# Patient Record
Sex: MALE | Race: BLACK OR AFRICAN AMERICAN | NOT HISPANIC OR LATINO | Employment: FULL TIME | URBAN - METROPOLITAN AREA
[De-identification: names, ages, dates, MRNs, and addresses within clinical notes are randomized per-mention and may not be internally consistent; named-entity substitution may affect disease eponyms.]

---

## 2017-02-10 ENCOUNTER — OFFICE VISIT (OUTPATIENT)
Dept: FAMILY MEDICINE CLINIC | Facility: CLINIC | Age: 42
End: 2017-02-10

## 2017-02-10 VITALS
DIASTOLIC BLOOD PRESSURE: 82 MMHG | TEMPERATURE: 97.6 F | HEART RATE: 76 BPM | HEIGHT: 60 IN | OXYGEN SATURATION: 98 % | BODY MASS INDEX: 50.45 KG/M2 | SYSTOLIC BLOOD PRESSURE: 124 MMHG | WEIGHT: 257 LBS

## 2017-02-10 DIAGNOSIS — Z00.00 ROUTINE GENERAL MEDICAL EXAMINATION AT A HEALTH CARE FACILITY: Primary | ICD-10-CM

## 2017-02-10 DIAGNOSIS — Z23 IMMUNIZATION DUE: ICD-10-CM

## 2017-02-10 PROCEDURE — 99395 PREV VISIT EST AGE 18-39: CPT | Performed by: PHYSICIAN ASSISTANT

## 2017-02-10 PROCEDURE — 93000 ELECTROCARDIOGRAM COMPLETE: CPT | Performed by: PHYSICIAN ASSISTANT

## 2017-02-10 RX ORDER — MECLIZINE HYDROCHLORIDE 25 MG/1
25 TABLET ORAL 3 TIMES DAILY PRN
Qty: 30 TABLET | Refills: 1 | Status: SHIPPED | OUTPATIENT
Start: 2017-02-10 | End: 2018-09-21 | Stop reason: SDUPTHER

## 2017-02-10 NOTE — PROGRESS NOTES
Subjective   Burke Cedeno Jr is a 41 y.o. male    History of Present Illness  Patient's 41-year-old black male who comes in for preventive medical exam, has occasional back pain, no shortness breath no chest pain      The following portions of the patient's history were reviewed and updated as appropriate: allergies, current medications, past social history and problem list    Review of Systems   Constitutional: Negative.    HENT: Negative.    Eyes: Negative.    Respiratory: Negative.    Cardiovascular: Negative.    Gastrointestinal: Negative.    Endocrine: Negative.    Genitourinary: Negative.    Musculoskeletal: Positive for back pain.   Skin: Negative.    Allergic/Immunologic: Negative.    Neurological: Negative.    Hematological: Negative.    Psychiatric/Behavioral: Negative.    All other systems reviewed and are negative.      Objective     Vitals:    02/10/17 0923   BP: 124/82   Pulse: 76   Temp: 97.6 °F (36.4 °C)   SpO2: 98%       Physical Exam   Constitutional: He is oriented to person, place, and time. He appears well-developed and well-nourished.   HENT:   Head: Normocephalic and atraumatic.   Right Ear: External ear normal.   Left Ear: External ear normal.   Nose: Nose normal.   Mouth/Throat: Oropharynx is clear and moist.   Eyes: Conjunctivae and EOM are normal. Pupils are equal, round, and reactive to light.   Neck: Normal range of motion. Neck supple. No thyromegaly present.   Cardiovascular: Normal rate, regular rhythm, normal heart sounds and intact distal pulses.    No murmur heard.  Pulmonary/Chest: Effort normal and breath sounds normal.   Abdominal: Soft. Bowel sounds are normal. He exhibits no mass. There is no tenderness.   Genitourinary: Rectum normal, prostate normal and penis normal.   Musculoskeletal: Normal range of motion. He exhibits no edema.        Lumbar back: He exhibits tenderness.   Neurological: He is alert and oriented to person, place, and time. He has normal reflexes. No cranial  nerve deficit.   Skin: Skin is warm and dry.   Psychiatric: He has a normal mood and affect.       ECG 12 Lead  Date/Time: 2/10/2017 9:43 AM  Performed by: AUGUSTO GRESHAM  Authorized by: AUGUSTO GRESHAM   Comparison: not compared with previous ECG   Previous ECG: no previous ECG available  Rhythm: sinus rhythm  Rate: normal  ST Segments: ST segments normal  T Waves: T waves normal  QRS axis: normal  Other: no other findings  Clinical impression: normal ECG  Comments: Normal, NSR          Assessment/Plan     Diagnoses and all orders for this visit:    Routine general medical examination at a health care facility  -     ECG 12 Lead  -     Lipid Panel  -     CBC & Differential  -     TSH  -     Comprehensive Metabolic Panel  -     meclizine (ANTIVERT) 25 MG tablet; Take 1 tablet by mouth 3 (Three) Times a Day As Needed for dizziness.    Immunization due  -     Tdap Vaccine Greater Than or Equal To 6yo IM     preventive medicine discussed, diet, exercise, healthy living tips, discussed diet low carb, low calorie, exercise 3-5 times per week for 30 minutes.

## 2017-04-27 ENCOUNTER — TELEPHONE (OUTPATIENT)
Dept: FAMILY MEDICINE CLINIC | Facility: CLINIC | Age: 42
End: 2017-04-27

## 2017-04-27 NOTE — TELEPHONE ENCOUNTER
----- Message from Maira Bonilla sent at 4/27/2017 10:54 AM EDT -----  Contact: PATIENT  PLEASE CALL HIM WITH RESULTS OF HIS LABS ON 2/11/17 [phone]

## 2017-04-27 NOTE — TELEPHONE ENCOUNTER
Labs were normal with nothing out of range. Advised pt of this and he verbalized understanding. Pt requested a copy be mailed to him which I have placed up front for mailing.

## 2017-08-14 ENCOUNTER — OFFICE VISIT (OUTPATIENT)
Dept: FAMILY MEDICINE CLINIC | Facility: CLINIC | Age: 42
End: 2017-08-14

## 2017-08-14 VITALS
BODY MASS INDEX: 35.42 KG/M2 | DIASTOLIC BLOOD PRESSURE: 66 MMHG | HEIGHT: 71 IN | RESPIRATION RATE: 18 BRPM | SYSTOLIC BLOOD PRESSURE: 124 MMHG | TEMPERATURE: 98.8 F | WEIGHT: 253 LBS

## 2017-08-14 DIAGNOSIS — J40 BRONCHITIS: Primary | ICD-10-CM

## 2017-08-14 DIAGNOSIS — R42 DIZZINESS: ICD-10-CM

## 2017-08-14 LAB — GLUCOSE BLDC GLUCOMTR-MCNC: 104 MG/DL (ref 70–130)

## 2017-08-14 PROCEDURE — 82962 GLUCOSE BLOOD TEST: CPT | Performed by: PHYSICIAN ASSISTANT

## 2017-08-14 PROCEDURE — 99213 OFFICE O/P EST LOW 20 MIN: CPT | Performed by: PHYSICIAN ASSISTANT

## 2017-08-14 RX ORDER — PREDNISONE 20 MG/1
20 TABLET ORAL 2 TIMES DAILY
Qty: 14 TABLET | Refills: 0 | Status: SHIPPED | OUTPATIENT
Start: 2017-08-14 | End: 2018-11-13

## 2017-08-14 NOTE — PROGRESS NOTES
Subjective   Burke Cedeno Jr is a 41 y.o. male    History of Present Illness  Patient pleasant 41-year-old black male comes in plan of sinus pressure sinus congestion productive cough with green-yellow sputum cough is worse at night when lying down, patient states she has mild sore throat symptoms been present for 2 weeks patient complains of dizziness lightheadedness dizziness is worse in the morning patient's concerned about possible diabetes.  No over-the-counter medications has been taking  The following portions of the patient's history were reviewed and updated as appropriate: allergies, current medications, past social history and problem list    Review of Systems   Constitutional: Negative for chills, fatigue and fever.   HENT: Positive for congestion, ear pain, postnasal drip, rhinorrhea and sinus pressure. Negative for sore throat.    Eyes: Positive for pain.   Respiratory: Positive for cough, chest tightness and wheezing. Negative for shortness of breath.    Cardiovascular: Negative for chest pain.   Gastrointestinal: Negative for nausea.   Neurological: Positive for headaches. Negative for dizziness.   Hematological: Negative for adenopathy.       Objective     Vitals:    08/14/17 1304   BP: 124/66   Resp: 18   Temp: 98.8 °F (37.1 °C)       Physical Exam   Constitutional: He appears well-developed and well-nourished. No distress.   HENT:   Head: Normocephalic and atraumatic.   Right Ear: Tympanic membrane and ear canal normal.   Left Ear: Tympanic membrane and ear canal normal.   Nose: Mucosal edema, rhinorrhea and sinus tenderness present. Right sinus exhibits maxillary sinus tenderness and frontal sinus tenderness. Left sinus exhibits maxillary sinus tenderness and frontal sinus tenderness.   Mouth/Throat: Oropharynx is clear and moist. No oropharyngeal exudate.   Eyes: Pupils are equal, round, and reactive to light.   Neck: Neck supple. No JVD present.   Cardiovascular: Normal rate, regular rhythm and  normal heart sounds.    No murmur heard.  Pulmonary/Chest: Effort normal and breath sounds normal. No stridor. No respiratory distress.   Musculoskeletal: He exhibits no edema.   Lymphadenopathy:     He has no cervical adenopathy.   Skin: He is not diaphoretic.   Nursing note and vitals reviewed.      Assessment/Plan     Diagnoses and all orders for this visit:    Bronchitis  -     clarithromycin XL (BIAXIN XL) 500 MG 24 hr tablet; Take 2 tablets by mouth Daily.  -     predniSONE (DELTASONE) 20 MG tablet; Take 1 tablet by mouth 2 (Two) Times a Day.    Dizziness  -     POCT Glucose    Tussionex 1 teaspoon every 12 hours when necessary cough #120ml

## 2018-09-21 ENCOUNTER — TELEPHONE (OUTPATIENT)
Dept: FAMILY MEDICINE CLINIC | Facility: CLINIC | Age: 43
End: 2018-09-21

## 2018-09-21 DIAGNOSIS — Z00.00 ROUTINE GENERAL MEDICAL EXAMINATION AT A HEALTH CARE FACILITY: ICD-10-CM

## 2018-09-21 RX ORDER — MECLIZINE HYDROCHLORIDE 25 MG/1
25 TABLET ORAL 3 TIMES DAILY PRN
Qty: 30 TABLET | Refills: 0 | Status: SHIPPED | OUTPATIENT
Start: 2018-09-21 | End: 2018-11-13

## 2018-09-21 NOTE — TELEPHONE ENCOUNTER
----- Message from Danuta Armenta sent at 9/21/2018  2:45 PM EDT -----  Contact: PT  REFILL REQUEST    meclizine (ANTIVERT) 25 MG tablet    [facility]/pharmacy #[id number] - [facility] [address] - [phone]  - [phone] (Phone)  [phone] (Fax)

## 2018-11-13 ENCOUNTER — OFFICE VISIT (OUTPATIENT)
Dept: FAMILY MEDICINE CLINIC | Facility: CLINIC | Age: 43
End: 2018-11-13

## 2018-11-13 VITALS
HEIGHT: 71 IN | TEMPERATURE: 98.1 F | BODY MASS INDEX: 37.27 KG/M2 | OXYGEN SATURATION: 99 % | SYSTOLIC BLOOD PRESSURE: 118 MMHG | HEART RATE: 63 BPM | RESPIRATION RATE: 16 BRPM | DIASTOLIC BLOOD PRESSURE: 84 MMHG | WEIGHT: 266.2 LBS

## 2018-11-13 DIAGNOSIS — Z00.00 ROUTINE GENERAL MEDICAL EXAMINATION AT A HEALTH CARE FACILITY: Primary | ICD-10-CM

## 2018-11-13 DIAGNOSIS — Z12.5 SPECIAL SCREENING FOR MALIGNANT NEOPLASM OF PROSTATE: ICD-10-CM

## 2018-11-13 LAB
BILIRUB BLD-MCNC: NEGATIVE MG/DL
CLARITY, POC: CLEAR
COLOR UR: ABNORMAL
GLUCOSE UR STRIP-MCNC: NEGATIVE MG/DL
KETONES UR QL: ABNORMAL
LEUKOCYTE EST, POC: NEGATIVE
NITRITE UR-MCNC: NEGATIVE MG/ML
PH UR: 5 [PH] (ref 5–8)
PROT UR STRIP-MCNC: ABNORMAL MG/DL
RBC # UR STRIP: ABNORMAL /UL
SP GR UR: 1.02 (ref 1–1.03)
UROBILINOGEN UR QL: NORMAL

## 2018-11-13 PROCEDURE — 93000 ELECTROCARDIOGRAM COMPLETE: CPT | Performed by: PHYSICIAN ASSISTANT

## 2018-11-13 PROCEDURE — 81003 URINALYSIS AUTO W/O SCOPE: CPT | Performed by: PHYSICIAN ASSISTANT

## 2018-11-13 PROCEDURE — 99396 PREV VISIT EST AGE 40-64: CPT | Performed by: PHYSICIAN ASSISTANT

## 2018-11-13 NOTE — PROGRESS NOTES
Subjective   Burke Cedeno Jr is a 43 y.o. male  Annual Exam (Pt is fasting. Already had flu vaccine. )      History of Present Illness  Patient is a 43-year-old black male comes in for preventive medical examination, denies any problems or complaints doing well no shortness breath no chest pain  The following portions of the patient's history were reviewed and updated as appropriate: allergies, current medications, past social history and problem list    Review of Systems   Constitutional: Negative.    HENT: Negative.    Eyes: Negative.    Respiratory: Negative.    Cardiovascular: Negative.    Gastrointestinal: Negative.    Endocrine: Negative.    Genitourinary: Negative.    Musculoskeletal: Negative.    Skin: Negative.    Allergic/Immunologic: Negative.    Neurological: Negative.    Hematological: Negative.    Psychiatric/Behavioral: Negative.    All other systems reviewed and are negative.      Objective     Vitals:    11/13/18 1332   BP: 118/84   Pulse: 63   Resp: 16   Temp: 98.1 °F (36.7 °C)   SpO2: 99%       Physical Exam   Constitutional: He is oriented to person, place, and time. He appears well-developed and well-nourished.   HENT:   Head: Normocephalic and atraumatic.   Right Ear: External ear normal.   Left Ear: External ear normal.   Nose: Nose normal.   Mouth/Throat: Oropharynx is clear and moist.   Eyes: Conjunctivae and EOM are normal. Pupils are equal, round, and reactive to light.   Neck: Normal range of motion. Neck supple. No thyromegaly present.   Cardiovascular: Normal rate, regular rhythm, normal heart sounds and intact distal pulses.   No murmur heard.  Pulmonary/Chest: Effort normal and breath sounds normal.   Abdominal: Soft. Bowel sounds are normal. He exhibits no mass. There is no tenderness.   Genitourinary: Rectum normal, prostate normal and penis normal.   Musculoskeletal: Normal range of motion. He exhibits no edema.   Neurological: He is alert and oriented to person, place, and time. He  has normal reflexes. No cranial nerve deficit.   Skin: Skin is warm and dry.   Psychiatric: He has a normal mood and affect.       ECG 12 Lead  Date/Time: 11/13/2018 2:16 PM  Performed by: Terrence Freitas PA  Authorized by: Terrence Freitas PA   Rate: normal  Conduction: conduction normal  ST Segments: ST segments normal  T Waves: T waves normal  QRS axis: normal  Clinical impression: normal ECG          Assessment/Plan     Diagnoses and all orders for this visit:    Routine general medical examination at a health care facility  -     POCT urinalysis dipstick, automated  -     CBC & Differential; Future  -     Comprehensive Metabolic Panel; Future  -     TSH; Future  -     Lipid Panel; Future    Special screening for malignant neoplasm of prostate  -     PSA Screen; Future    Preventive medicine discussed, diet, exercise, healthy living, discussed importance of losing weight exercise 3-5 times per week for 30 minutes low-carb low calorie diet

## 2018-11-30 ENCOUNTER — TELEPHONE (OUTPATIENT)
Dept: FAMILY MEDICINE CLINIC | Facility: CLINIC | Age: 43
End: 2018-11-30

## 2018-11-30 NOTE — TELEPHONE ENCOUNTER
----- Message from Danuta Armenta sent at 11/30/2018  1:10 PM EST -----  Contact: PT  REQUESTING LAB RESULTS. PLEASE CALL [phone]

## 2019-11-12 DIAGNOSIS — Z00.00 ROUTINE GENERAL MEDICAL EXAMINATION AT A HEALTH CARE FACILITY: ICD-10-CM

## 2019-11-12 RX ORDER — MECLIZINE HYDROCHLORIDE 25 MG/1
25 TABLET ORAL 3 TIMES DAILY PRN
Qty: 30 TABLET | Refills: 0 | Status: SHIPPED | OUTPATIENT
Start: 2019-11-12 | End: 2021-02-24

## 2020-02-17 ENCOUNTER — OFFICE VISIT (OUTPATIENT)
Dept: FAMILY MEDICINE CLINIC | Facility: CLINIC | Age: 45
End: 2020-02-17

## 2020-02-17 VITALS
RESPIRATION RATE: 16 BRPM | TEMPERATURE: 97.5 F | HEART RATE: 69 BPM | WEIGHT: 266.2 LBS | OXYGEN SATURATION: 98 % | BODY MASS INDEX: 37.27 KG/M2 | DIASTOLIC BLOOD PRESSURE: 84 MMHG | HEIGHT: 71 IN | SYSTOLIC BLOOD PRESSURE: 122 MMHG

## 2020-02-17 DIAGNOSIS — Z00.00 ROUTINE GENERAL MEDICAL EXAMINATION AT A HEALTH CARE FACILITY: Primary | ICD-10-CM

## 2020-02-17 DIAGNOSIS — Z12.5 SPECIAL SCREENING FOR MALIGNANT NEOPLASM OF PROSTATE: ICD-10-CM

## 2020-02-17 LAB
BILIRUB BLD-MCNC: NEGATIVE MG/DL
CLARITY, POC: CLEAR
COLOR UR: YELLOW
GLUCOSE UR STRIP-MCNC: NEGATIVE MG/DL
KETONES UR QL: NEGATIVE
LEUKOCYTE EST, POC: NEGATIVE
NITRITE UR-MCNC: NEGATIVE MG/ML
PH UR: 6 [PH] (ref 5–8)
PROT UR STRIP-MCNC: ABNORMAL MG/DL
RBC # UR STRIP: ABNORMAL /UL
SP GR UR: 1.03 (ref 1–1.03)
UROBILINOGEN UR QL: NORMAL

## 2020-02-17 PROCEDURE — 81003 URINALYSIS AUTO W/O SCOPE: CPT | Performed by: PHYSICIAN ASSISTANT

## 2020-02-17 PROCEDURE — 93000 ELECTROCARDIOGRAM COMPLETE: CPT | Performed by: PHYSICIAN ASSISTANT

## 2020-02-17 PROCEDURE — 99396 PREV VISIT EST AGE 40-64: CPT | Performed by: PHYSICIAN ASSISTANT

## 2020-02-17 RX ORDER — SUMATRIPTAN 100 MG/1
TABLET, FILM COATED ORAL
Qty: 9 TABLET | Refills: 1 | Status: SHIPPED | OUTPATIENT
Start: 2020-02-17 | End: 2021-02-24

## 2020-02-17 RX ORDER — PROMETHAZINE HYDROCHLORIDE 25 MG/1
25 TABLET ORAL EVERY 6 HOURS PRN
Qty: 30 TABLET | Refills: 1 | Status: SHIPPED | OUTPATIENT
Start: 2020-02-17 | End: 2021-02-24

## 2020-02-17 NOTE — PROGRESS NOTES
Subjective   Burke Cedeno Jr is a 44 y.o. male  Annual Exam (Not fasting. Had flu vaccine. )      History of Present Illness  Patient is a pleasant 42-year-old white male who comes in for preventive medical examination complains of recurrent sick headaches he has photophobia and phonophobia 2 to 3/week states he has unilateral headache with nausea  The following portions of the patient's history were reviewed and updated as appropriate: allergies, current medications, past social history and problem list    Review of Systems   Constitutional: Negative.    Eyes: Negative.    Respiratory: Negative.    Cardiovascular: Negative.    Gastrointestinal: Negative.    Endocrine: Negative.    Genitourinary: Negative.    Musculoskeletal: Negative.    Skin: Negative.    Allergic/Immunologic: Negative.    Neurological: Positive for headaches.   Hematological: Negative.    Psychiatric/Behavioral: Negative.    All other systems reviewed and are negative.      Objective     Vitals:    02/17/20 1345   BP: 122/84   Pulse: 69   Resp: 16   Temp: 97.5 °F (36.4 °C)   SpO2: 98%       Physical Exam   Constitutional: He is oriented to person, place, and time. He appears well-developed and well-nourished.   HENT:   Head: Normocephalic and atraumatic.   Right Ear: External ear normal.   Left Ear: External ear normal.   Nose: Nose normal.   Mouth/Throat: Oropharynx is clear and moist.   Eyes: Pupils are equal, round, and reactive to light. Conjunctivae and EOM are normal.   Neck: Normal range of motion. Neck supple. No thyromegaly present.   Cardiovascular: Normal rate, regular rhythm, normal heart sounds and intact distal pulses.   No murmur heard.  Pulmonary/Chest: Effort normal and breath sounds normal.   Abdominal: Soft. Bowel sounds are normal. He exhibits no mass. There is no tenderness.   Genitourinary: Rectum normal, prostate normal and penis normal.   Musculoskeletal: Normal range of motion. He exhibits no edema.   Neurological: He is  alert and oriented to person, place, and time. He has normal reflexes. No cranial nerve deficit.   Skin: Skin is warm and dry.   Psychiatric: He has a normal mood and affect.       ECG 12 Lead  Date/Time: 2/17/2020 2:38 PM  Performed by: Terrence Freitas PA  Authorized by: Terrence Freitas PA   Comparison: not compared with previous ECG   Rhythm: sinus rhythm  Rate: normal  ST Segments: ST segments normal  T Waves: T waves normal  QRS axis: normal    Clinical impression: normal ECG          Assessment/Plan     Burke was seen today for annual exam.    Diagnoses and all orders for this visit:    Routine general medical examination at a health care facility  -     POCT urinalysis dipstick, automated  -     Comprehensive Metabolic Panel; Future  -     CBC & Differential; Future  -     Lipid Panel; Future  -     TSH; Future    Special screening for malignant neoplasm of prostate  -     PSA Screen; Future    Other orders  -     SUMAtriptan (IMITREX) 100 MG tablet; Take one tablet at onset of headache. May repeat dose one time in 2 hours if headache not relieved.  -     promethazine (PHENERGAN) 25 MG tablet; Take 1 tablet by mouth Every 6 (Six) Hours As Needed for Nausea or Vomiting.    Preventive medicine discussed, discussed importance of losing weight exercise low carb diet exercise 3-5 times per week for 30 minutes discussed headaches given prescription sumatriptan for migraine headaches

## 2020-02-18 NOTE — PROGRESS NOTES
I have reviewed the notes, assessments, and/or procedures performed by Jay Freitas PA-C, I concur with his documentation of Burke Cedeno Jr.

## 2020-10-02 ENCOUNTER — TELEPHONE (OUTPATIENT)
Dept: FAMILY MEDICINE CLINIC | Facility: CLINIC | Age: 45
End: 2020-10-02

## 2020-10-02 RX ORDER — CEFDINIR 300 MG/1
300 CAPSULE ORAL 2 TIMES DAILY
Qty: 20 CAPSULE | Refills: 0 | Status: SHIPPED | OUTPATIENT
Start: 2020-10-02 | End: 2021-02-24

## 2020-10-02 RX ORDER — PREDNISONE 20 MG/1
20 TABLET ORAL 2 TIMES DAILY
Qty: 14 TABLET | Refills: 0 | Status: SHIPPED | OUTPATIENT
Start: 2020-10-02 | End: 2021-02-24

## 2021-02-24 ENCOUNTER — OFFICE VISIT (OUTPATIENT)
Dept: FAMILY MEDICINE CLINIC | Facility: CLINIC | Age: 46
End: 2021-02-24

## 2021-02-24 VITALS
BODY MASS INDEX: 35.64 KG/M2 | OXYGEN SATURATION: 99 % | DIASTOLIC BLOOD PRESSURE: 78 MMHG | RESPIRATION RATE: 14 BRPM | SYSTOLIC BLOOD PRESSURE: 120 MMHG | HEIGHT: 71 IN | TEMPERATURE: 97.1 F | WEIGHT: 254.6 LBS | HEART RATE: 74 BPM

## 2021-02-24 DIAGNOSIS — Z12.5 SPECIAL SCREENING FOR MALIGNANT NEOPLASM OF PROSTATE: ICD-10-CM

## 2021-02-24 DIAGNOSIS — Z00.00 ROUTINE GENERAL MEDICAL EXAMINATION AT A HEALTH CARE FACILITY: Primary | ICD-10-CM

## 2021-02-24 PROCEDURE — 99396 PREV VISIT EST AGE 40-64: CPT | Performed by: PHYSICIAN ASSISTANT

## 2021-02-24 RX ORDER — ACETAMINOPHEN 160 MG
2000 TABLET,DISINTEGRATING ORAL DAILY
COMMUNITY
End: 2022-12-12

## 2021-02-24 NOTE — PROGRESS NOTES
Subjective   Burke Cedeno Jr is a 45 y.o. male  Annual Exam      History of Present Illness  Patient is a pleasant 45-year-old black male who comes in preventive medical examination denies any problems or complaints has lost 30 pounds exercising doing well.      The following portions of the patient's history were reviewed and updated as appropriate: allergies, current medications, past social history and problem list    Review of Systems   Constitutional: Negative for appetite change, diaphoresis, fatigue and unexpected weight change.   Eyes: Negative for visual disturbance.   Respiratory: Negative for cough, chest tightness and shortness of breath.    Cardiovascular: Negative for chest pain, palpitations and leg swelling.   Gastrointestinal: Negative for diarrhea, nausea and vomiting.   Endocrine: Negative for polydipsia, polyphagia and polyuria.   Skin: Negative for color change and rash.   Neurological: Negative for dizziness, syncope, weakness, light-headedness, numbness and headaches.       Objective     Vitals:    02/24/21 1602   BP: 120/78   Pulse: 74   Resp: 14   Temp: 97.1 °F (36.2 °C)   SpO2: 99%       Physical Exam  Vitals signs and nursing note reviewed.   Constitutional:       Appearance: He is well-developed. He is not diaphoretic.   Neck:      Musculoskeletal: Neck supple.      Thyroid: No thyromegaly.      Vascular: No JVD.   Cardiovascular:      Rate and Rhythm: Normal rate and regular rhythm.      Pulses: Normal pulses.      Heart sounds: Normal heart sounds. No murmur.   Pulmonary:      Effort: Pulmonary effort is normal. No respiratory distress.      Breath sounds: Normal breath sounds.   Abdominal:      General: Bowel sounds are normal.      Palpations: Abdomen is soft.      Tenderness: There is no abdominal tenderness.   Lymphadenopathy:      Cervical: No cervical adenopathy.   Skin:     General: Skin is warm and dry.   Neurological:      Sensory: No sensory deficit.       ECG 12  Lead    Date/Time: 3/17/2021 4:27 PM  Performed by: Terrence Freitas PA  Authorized by: Terrence Freitas PA   Comparison: not compared with previous ECG   Rate: normal  Conduction: conduction normal  ST Segments: ST segments normal  T Waves: T waves normal  QRS axis: normal    Clinical impression: normal ECG            Assessment/Plan     Diagnoses and all orders for this visit:    1. Routine general medical examination at a health care facility (Primary)  -     Cancel: PSA Screen; Future  -     CBC & Differential; Future  -     Comprehensive Metabolic Panel; Future  -     Hemoglobin A1c; Future  -     TSH; Future  -     Lipid Panel; Future  -     Hepatitis C Antibody; Future  -     Hepatitis C Antibody  -     Lipid Panel  -     PSA Screen  -     Comprehensive Metabolic Panel  -     CBC & Differential  -     Hemoglobin A1c  -     TSH    2. Special screening for malignant neoplasm of prostate  -     Cancel: CBC & Differential; Future  -     Cancel: Comprehensive Metabolic Panel; Future  -     Cancel: Hemoglobin A1c; Future  -     Cancel: TSH; Future  -     Cancel: Lipid Panel; Future  -     Cancel: Hepatitis C Antibody; Future  -     PSA Screen; Future    Preventive medicine discussed, diet, exercise, healthy living discussed importance of losing weight and exercise.

## 2021-03-17 PROCEDURE — 93000 ELECTROCARDIOGRAM COMPLETE: CPT | Performed by: PHYSICIAN ASSISTANT

## 2021-06-07 ENCOUNTER — TELEPHONE (OUTPATIENT)
Dept: FAMILY MEDICINE CLINIC | Facility: CLINIC | Age: 46
End: 2021-06-07

## 2021-06-07 RX ORDER — CYCLOBENZAPRINE HCL 10 MG
10 TABLET ORAL 3 TIMES DAILY
Qty: 21 TABLET | Refills: 0 | Status: SHIPPED | OUTPATIENT
Start: 2021-06-07 | End: 2021-06-14

## 2021-06-07 RX ORDER — METHYLPREDNISOLONE 4 MG/1
TABLET ORAL
Qty: 30 EACH | Refills: 0 | Status: SHIPPED | OUTPATIENT
Start: 2021-06-07 | End: 2021-06-15

## 2021-06-15 ENCOUNTER — OFFICE VISIT (OUTPATIENT)
Dept: FAMILY MEDICINE CLINIC | Facility: CLINIC | Age: 46
End: 2021-06-15

## 2021-06-15 VITALS
HEIGHT: 71 IN | SYSTOLIC BLOOD PRESSURE: 122 MMHG | OXYGEN SATURATION: 97 % | TEMPERATURE: 97.6 F | DIASTOLIC BLOOD PRESSURE: 80 MMHG | HEART RATE: 74 BPM | BODY MASS INDEX: 35.48 KG/M2 | RESPIRATION RATE: 16 BRPM | WEIGHT: 253.4 LBS

## 2021-06-15 DIAGNOSIS — S39.012A STRAIN OF LUMBAR REGION, INITIAL ENCOUNTER: Primary | ICD-10-CM

## 2021-06-15 PROCEDURE — 99213 OFFICE O/P EST LOW 20 MIN: CPT | Performed by: PHYSICIAN ASSISTANT

## 2021-06-15 RX ORDER — TIZANIDINE HYDROCHLORIDE 4 MG/1
4 CAPSULE, GELATIN COATED ORAL NIGHTLY
Qty: 30 CAPSULE | Refills: 1 | Status: SHIPPED | OUTPATIENT
Start: 2021-06-15 | End: 2022-12-12

## 2021-06-15 NOTE — PROGRESS NOTES
Subjective   Burke Cedeno Jr is a 45 y.o. male  Back Pain      History of Present Illness  Patient is a pleasant 45-year-old male who comes in complaining of chronic low back pain, patient states Flexeril did help symptoms but he cannot tolerate meds due to sedation.  Patient has pain with sitting standing pain is mainly located in lower back, has pain with sitting standing pain is moderate to severe large amount of spasm in lower back.  The following portions of the patient's history were reviewed and updated as appropriate: allergies, current medications, past social history and problem list    Review of Systems   Constitutional: Negative for appetite change, diaphoresis, fatigue and unexpected weight change.   Eyes: Negative for visual disturbance.   Respiratory: Negative for cough, chest tightness and shortness of breath.    Cardiovascular: Negative for chest pain, palpitations and leg swelling.   Gastrointestinal: Negative for diarrhea, nausea and vomiting.   Endocrine: Negative for polydipsia, polyphagia and polyuria.   Musculoskeletal: Positive for back pain.   Skin: Negative for color change and rash.   Neurological: Negative for dizziness, syncope, weakness, light-headedness, numbness and headaches.       Objective     Vitals:    06/15/21 1135   BP: 122/80   Pulse: 74   Resp: 16   Temp: 97.6 °F (36.4 °C)   SpO2: 97%       Physical Exam  Vitals and nursing note reviewed.   Constitutional:       Appearance: He is well-developed.   Neck:      Vascular: No JVD.   Cardiovascular:      Rate and Rhythm: Normal rate and regular rhythm.      Pulses: Normal pulses.      Heart sounds: Normal heart sounds. No murmur heard.     Pulmonary:      Effort: Pulmonary effort is normal. No respiratory distress.      Breath sounds: Normal breath sounds.   Abdominal:      General: Bowel sounds are normal.      Palpations: Abdomen is soft.      Tenderness: There is no abdominal tenderness.   Musculoskeletal:      Lumbar back: Bony  tenderness present. Decreased range of motion.   Skin:     General: Skin is warm and dry.         Assessment/Plan     Diagnoses and all orders for this visit:    1. Strain of lumbar region, initial encounter (Primary)  -     TiZANidine (Zanaflex) 4 MG capsule; Take 1 capsule by mouth Every Night.  Dispense: 30 capsule; Refill: 1  -     Ambulatory Referral to Physical Therapy    Follow-up if no better, follow-up after physical therapy recheck in 3 weeks

## 2021-08-18 ENCOUNTER — TELEPHONE (OUTPATIENT)
Dept: FAMILY MEDICINE CLINIC | Facility: CLINIC | Age: 46
End: 2021-08-18

## 2021-08-18 NOTE — TELEPHONE ENCOUNTER
Caller: Burke Cedeno Jr    Relationship to patient: Self    Best call back number: [phone]     Date of exposure:     Date of positive COVID19 test: 08/18/2021 SARS WAITING ON SECOND RESULT    Date if possible COVID19 exposure:     COVID19 symptoms: BAD COUGH, THROAT IRRITATION, HEADACHE    Date of initial quarantine: 08/18/2021    Additional information or concerns: PATIENT STILL WAITING ON RESULTS FROM SECOND TEST  PATIENT REQUESTING A CALL WITH CONCERNS AND QUESTIONS HE NEEDS ANSWERED    What is the patients preferred pharmacy: [facility]

## 2021-08-18 NOTE — TELEPHONE ENCOUNTER
Spoke with pt he had questions about Covid test. Pt states that the rapid test showed it was positive but he got a second test to send out to make sure he was really positive. Advised pt to quarantine til he gets his second results.

## 2022-05-17 ENCOUNTER — TELEPHONE (OUTPATIENT)
Dept: FAMILY MEDICINE CLINIC | Facility: CLINIC | Age: 47
End: 2022-05-17

## 2022-05-17 RX ORDER — AZELASTINE 1 MG/ML
2 SPRAY, METERED NASAL 2 TIMES DAILY
Qty: 30 ML | Refills: 12 | Status: SHIPPED | OUTPATIENT
Start: 2022-05-17 | End: 2022-12-12

## 2022-05-17 NOTE — TELEPHONE ENCOUNTER
Patient is requesting a medication for allergies sent into his pharmacy.     LOV: 6/15/21  NOV: was for 2/25/22 but the patient cancelled.     Will you prescribe the patient a medication for allergies or will they need to pick something up OTC.

## 2022-05-17 NOTE — TELEPHONE ENCOUNTER
Caller: Burke Cedeno Jr    Relationship: Self    Best call back number: [phone]    What medication are you requesting: SOMETHING FOR ALLERGIES ( NOTHING OVER COUNTER HAS HELPED)    What are your current symptoms: SNEEZING, RUNNY NOSE, SORE THROAT FROM DRAINAGE    How long have you been experiencing symptoms: 05/16/22    Have you had these symptoms before:    [x] Yes  [] No    Have you been treated for these symptoms before:   [] Yes  [x] No    If a prescription is needed, what is your preferred pharmacy and phone number: [facility]/PHARMACY #[id number] - [address] [phone] [facility] [phone]      Additional notes: WOULD LIKE A CALL BACK TO LET HIM KNOW IF SOMETHING IS BEING SENT IN FOR HIM.

## 2022-12-12 ENCOUNTER — OFFICE VISIT (OUTPATIENT)
Dept: FAMILY MEDICINE CLINIC | Facility: CLINIC | Age: 47
End: 2022-12-12

## 2022-12-12 VITALS
SYSTOLIC BLOOD PRESSURE: 126 MMHG | HEIGHT: 71 IN | OXYGEN SATURATION: 100 % | DIASTOLIC BLOOD PRESSURE: 88 MMHG | HEART RATE: 65 BPM | BODY MASS INDEX: 36.6 KG/M2 | TEMPERATURE: 97 F | WEIGHT: 261.4 LBS | RESPIRATION RATE: 14 BRPM

## 2022-12-12 DIAGNOSIS — Z00.00 ROUTINE GENERAL MEDICAL EXAMINATION AT A HEALTH CARE FACILITY: Primary | ICD-10-CM

## 2022-12-12 DIAGNOSIS — S39.012A STRAIN OF LUMBAR REGION, INITIAL ENCOUNTER: ICD-10-CM

## 2022-12-12 DIAGNOSIS — Z12.5 SPECIAL SCREENING FOR MALIGNANT NEOPLASM OF PROSTATE: ICD-10-CM

## 2022-12-12 DIAGNOSIS — Z12.11 SCREEN FOR COLON CANCER: ICD-10-CM

## 2022-12-12 PROCEDURE — 99396 PREV VISIT EST AGE 40-64: CPT | Performed by: PHYSICIAN ASSISTANT

## 2022-12-12 PROCEDURE — 93000 ELECTROCARDIOGRAM COMPLETE: CPT | Performed by: PHYSICIAN ASSISTANT

## 2022-12-12 RX ORDER — TIZANIDINE HYDROCHLORIDE 4 MG/1
4 CAPSULE, GELATIN COATED ORAL NIGHTLY
Qty: 30 CAPSULE | Refills: 1 | Status: SHIPPED | OUTPATIENT
Start: 2022-12-12 | End: 2023-01-11

## 2022-12-12 NOTE — PROGRESS NOTES
Subjective   Burke Cedeno Jr is a 47 y.o. male  Annual Exam (Fasting for labs. Due for colorectal screening. )      History of Present Illness  Patient is a pleasant 47-year-old black male who comes in for preventive medical examination patient is complaining of some increased urgency but no frequency no dysuria no other BPH symptoms    The following portions of the patient's history were reviewed and updated as appropriate: allergies, current medications, past social history and problem list    Review of Systems   Constitutional: Negative for appetite change, diaphoresis, fatigue and unexpected weight change.   Eyes: Negative for visual disturbance.   Respiratory: Negative for cough, chest tightness and shortness of breath.    Cardiovascular: Negative for chest pain, palpitations and leg swelling.   Gastrointestinal: Negative for diarrhea, nausea and vomiting.   Endocrine: Negative for polydipsia, polyphagia and polyuria.   Skin: Negative for color change and rash.   Neurological: Negative for dizziness, syncope, weakness, light-headedness, numbness and headaches.       Objective     Vitals:    12/12/22 1002   BP: 126/88   Pulse: 65   Resp: 14   Temp: 97 °F (36.1 °C)   SpO2: 100%       Physical Exam  Vitals and nursing note reviewed.   Constitutional:       General: He is not in acute distress.     Appearance: Normal appearance. He is well-developed. He is not ill-appearing, toxic-appearing or diaphoretic.   HENT:      Head: Normocephalic and atraumatic.      Right Ear: External ear normal.      Left Ear: External ear normal.   Eyes:      Conjunctiva/sclera: Conjunctivae normal.      Pupils: Pupils are equal, round, and reactive to light.   Neck:      Thyroid: No thyromegaly.      Vascular: No carotid bruit.   Cardiovascular:      Rate and Rhythm: Normal rate and regular rhythm.      Pulses: Normal pulses.      Heart sounds: Normal heart sounds. No murmur heard.  Pulmonary:      Effort: Pulmonary effort is normal. No  respiratory distress.      Breath sounds: Normal breath sounds.   Abdominal:      General: Bowel sounds are normal.      Palpations: Abdomen is soft. There is no mass.      Tenderness: There is no abdominal tenderness.   Musculoskeletal:         General: No swelling. Normal range of motion.      Cervical back: Normal range of motion and neck supple.   Lymphadenopathy:      Cervical: No cervical adenopathy.   Skin:     General: Skin is warm and dry.      Findings: No lesion or rash.   Neurological:      Mental Status: He is alert and oriented to person, place, and time.      Cranial Nerves: No cranial nerve deficit.      Sensory: No sensory deficit.      Motor: No weakness.      Coordination: Coordination normal.      Gait: Gait normal.      Deep Tendon Reflexes: Reflexes are normal and symmetric.   Psychiatric:         Mood and Affect: Mood normal.         Behavior: Behavior normal.         Thought Content: Thought content normal.         Judgment: Judgment normal.         ECG 12 Lead    Date/Time: 12/12/2022 2:49 PM  Performed by: Terrence Freitas PA  Authorized by: Terrence Freitas PA   Comparison: not compared with previous ECG   Rhythm: sinus rhythm  Conduction: conduction normal  QRS axis: normal  Other findings: non-specific ST-T wave changes    Clinical impression: non-specific ECG          Assessment & Plan     Diagnoses and all orders for this visit:    1. Routine general medical examination at a health care facility (Primary)  -     Comprehensive Metabolic Panel; Future  -     Lipid Panel; Future  -     TSH; Future  -     CBC & Differential; Future  -     Hemoglobin A1c; Future    2. Special screening for malignant neoplasm of prostate  -     PSA Screen; Future    3. Screen for colon cancer  -     Ambulatory Referral For Screening Colonoscopy      Preventive medicine discussed, diet, exercise, healthy living discussed at length.  Discussed nutrition, physical activity, healthy weight, injury  prevention, misuse of tobacco, alcohol and drugs, dental health, mental health, immunizations, screening    Part of this note may be an electronic transcription/translation of spoken language to printed text using the Dragon Dictation System.     I spent 15 minutes in patient care: Reviewing records prior to the visit, examining the patient, entering orders and documentation

## 2022-12-13 ENCOUNTER — TELEPHONE (OUTPATIENT)
Dept: FAMILY MEDICINE CLINIC | Facility: CLINIC | Age: 47
End: 2022-12-13

## 2022-12-13 NOTE — TELEPHONE ENCOUNTER
Caller: Burke Cedeno Jr    Relationship: Self    Best call back number: [phone]    Caller requesting test results: SELF    What test was performed: BLOODWORK    When was the test performed: YESTERDAY    Where was the test performed: [facility]    Additional notes: HE WOULD LIKE TO DISCUSS RESULTS

## 2022-12-13 NOTE — TELEPHONE ENCOUNTER
Patient informed that we don't have lab results. They were done at [facility] and are usually faxed to us. Fax number provided and he is contacting [facility] to have them sent.

## 2023-01-10 DIAGNOSIS — S39.012A STRAIN OF LUMBAR REGION, INITIAL ENCOUNTER: ICD-10-CM

## 2023-01-11 RX ORDER — TIZANIDINE HYDROCHLORIDE 4 MG/1
CAPSULE, GELATIN COATED ORAL
Qty: 30 CAPSULE | Refills: 1 | Status: SHIPPED | OUTPATIENT
Start: 2023-01-11 | End: 2023-02-07 | Stop reason: SDUPTHER

## 2023-02-07 DIAGNOSIS — S39.012A STRAIN OF LUMBAR REGION, INITIAL ENCOUNTER: ICD-10-CM

## 2023-02-07 RX ORDER — TIZANIDINE HYDROCHLORIDE 4 MG/1
4 CAPSULE, GELATIN COATED ORAL NIGHTLY
Qty: 90 CAPSULE | Refills: 0 | Status: SHIPPED | OUTPATIENT
Start: 2023-02-07

## 2023-05-16 RX ORDER — SODIUM, POTASSIUM,MAG SULFATES 17.5-3.13G
1 SOLUTION, RECONSTITUTED, ORAL ORAL TAKE AS DIRECTED
Qty: 354 ML | Refills: 0 | Status: SHIPPED | OUTPATIENT
Start: 2023-05-16

## 2023-05-23 ENCOUNTER — OUTSIDE FACILITY SERVICE (OUTPATIENT)
Dept: GASTROENTEROLOGY | Facility: CLINIC | Age: 48
End: 2023-05-23
Payer: COMMERCIAL

## 2024-04-11 ENCOUNTER — OFFICE VISIT (OUTPATIENT)
Dept: FAMILY MEDICINE CLINIC | Facility: CLINIC | Age: 49
End: 2024-04-11
Payer: COMMERCIAL

## 2024-04-11 VITALS
RESPIRATION RATE: 14 BRPM | HEART RATE: 94 BPM | TEMPERATURE: 98.1 F | OXYGEN SATURATION: 98 % | BODY MASS INDEX: 38.27 KG/M2 | WEIGHT: 273.4 LBS | HEIGHT: 71 IN | SYSTOLIC BLOOD PRESSURE: 130 MMHG | DIASTOLIC BLOOD PRESSURE: 88 MMHG

## 2024-04-11 DIAGNOSIS — Z12.5 SPECIAL SCREENING FOR MALIGNANT NEOPLASM OF PROSTATE: ICD-10-CM

## 2024-04-11 DIAGNOSIS — Z00.00 ROUTINE GENERAL MEDICAL EXAMINATION AT A HEALTH CARE FACILITY: Primary | ICD-10-CM

## 2024-04-11 PROCEDURE — 99396 PREV VISIT EST AGE 40-64: CPT | Performed by: PHYSICIAN ASSISTANT

## 2024-04-11 RX ORDER — MECLIZINE HCL 12.5 MG/1
12.5 TABLET ORAL 3 TIMES DAILY PRN
Qty: 30 TABLET | Refills: 3 | Status: SHIPPED | OUTPATIENT
Start: 2024-04-11

## 2024-04-11 NOTE — PROGRESS NOTES
Subjective   Burke Cedeno Jr is a 48 y.o. male  Annual Exam (Not fasting. UTD on colonoscopy-due in 2033) and Med Refill (RF Antivert)      History of Present Illness  Patient very pleasant 48-year-old male who comes in for preventive medical examination also needs refill of meclizine for occasional positional vertigo      The following portions of the patient's history were reviewed and updated as appropriate: allergies, current medications, past social history and problem list    Review of Systems   Constitutional: Negative.    HENT: Negative.     Eyes: Negative.    Respiratory: Negative.     Cardiovascular: Negative.    Gastrointestinal: Negative.    Endocrine: Negative.    Genitourinary: Negative.    Musculoskeletal: Negative.    Skin: Negative.    Allergic/Immunologic: Negative.    Neurological: Negative.    Hematological: Negative.    Psychiatric/Behavioral: Negative.     All other systems reviewed and are negative.      Objective     Vitals:    04/11/24 1453   BP: 130/88   Pulse: 94   Resp: 14   Temp: 98.1 °F (36.7 °C)   SpO2: 98%       Physical Exam  Vitals and nursing note reviewed.   Constitutional:       General: He is not in acute distress.     Appearance: Normal appearance. He is well-developed. He is not ill-appearing, toxic-appearing or diaphoretic.   HENT:      Head: Normocephalic and atraumatic.      Right Ear: External ear normal.      Left Ear: External ear normal.   Eyes:      Conjunctiva/sclera: Conjunctivae normal.      Pupils: Pupils are equal, round, and reactive to light.   Neck:      Thyroid: No thyromegaly.      Vascular: No carotid bruit.   Cardiovascular:      Rate and Rhythm: Normal rate and regular rhythm.      Pulses: Normal pulses.      Heart sounds: Normal heart sounds. No murmur heard.  Pulmonary:      Effort: Pulmonary effort is normal. No respiratory distress.      Breath sounds: Normal breath sounds.   Abdominal:      General: Bowel sounds are normal.      Palpations: Abdomen is  soft. There is no mass.      Tenderness: There is no abdominal tenderness.   Musculoskeletal:         General: No swelling. Normal range of motion.      Cervical back: Normal range of motion and neck supple.   Lymphadenopathy:      Cervical: No cervical adenopathy.   Skin:     General: Skin is warm and dry.      Findings: No lesion or rash.   Neurological:      Mental Status: He is alert and oriented to person, place, and time.      Cranial Nerves: No cranial nerve deficit.      Sensory: No sensory deficit.      Motor: No weakness.      Coordination: Coordination normal.      Gait: Gait normal.      Deep Tendon Reflexes: Reflexes are normal and symmetric.   Psychiatric:         Mood and Affect: Mood normal.         Behavior: Behavior normal.         Thought Content: Thought content normal.         Judgment: Judgment normal.       ECG 12 Lead    Date/Time: 4/11/2024 5:27 PM  Performed by: Terrence Freitas PA    Authorized by: Terrence Freitas PA  Comparison: not compared with previous ECG   Rhythm: sinus rhythm  Rate: normal  ST Segments: ST segments normal  QRS axis: normal  Other findings: non-specific ST-T wave changes    Clinical impression: normal ECG           Assessment & Plan     Diagnoses and all orders for this visit:    1. Routine general medical examination at a health care facility (Primary)  -     Comprehensive Metabolic Panel; Future  -     Lipid Panel; Future  -     TSH; Future  -     CBC (No Diff); Future  -     meclizine (ANTIVERT) 12.5 MG tablet; Take 1 tablet by mouth 3 (Three) Times a Day As Needed for Dizziness.  Dispense: 30 tablet; Refill: 3  -     Hemoglobin A1c; Future    2. Special screening for malignant neoplasm of prostate  -     PSA Screen; Future     Preventive medicine discussed, diet, exercise, healthy living discussed at length.  Discussed nutrition, physical activity, healthy weight, injury prevention, misuse of tobacco, alcohol and drugs, dental health, mental  health, immunizations, screening    Part of this note may be an electronic transcription/translation of spoken language to printed text using the Dragon Dictation System.

## 2024-08-13 PROBLEM — M79.10 MUSCLE ACHE: Status: ACTIVE | Noted: 2024-08-13
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
## 2024-08-13 PROBLEM — R51.9 CEPHALALGIA: Status: ACTIVE | Noted: 2024-08-13

## 2024-08-14 ENCOUNTER — PATIENT ROUNDING (BHMG ONLY) (OUTPATIENT)
Dept: URGENT CARE | Facility: CLINIC | Age: 49
End: 2024-08-14
Payer: COMMERCIAL

## 2024-09-19 DIAGNOSIS — R05.1 ACUTE COUGH: ICD-10-CM

## 2024-09-19 RX ORDER — BENZONATATE 200 MG/1
200 CAPSULE ORAL 3 TIMES DAILY PRN
Qty: 21 CAPSULE | Refills: 0 | Status: SHIPPED | OUTPATIENT
Start: 2024-09-19

## 2025-04-14 ENCOUNTER — OFFICE VISIT (OUTPATIENT)
Dept: FAMILY MEDICINE CLINIC | Facility: CLINIC | Age: 50
End: 2025-04-14
Payer: COMMERCIAL

## 2025-04-14 VITALS
RESPIRATION RATE: 14 BRPM | OXYGEN SATURATION: 96 % | TEMPERATURE: 97.9 F | DIASTOLIC BLOOD PRESSURE: 94 MMHG | BODY MASS INDEX: 38.33 KG/M2 | HEART RATE: 85 BPM | SYSTOLIC BLOOD PRESSURE: 130 MMHG | WEIGHT: 273.8 LBS | HEIGHT: 71 IN

## 2025-04-14 DIAGNOSIS — Z00.00 ROUTINE GENERAL MEDICAL EXAMINATION AT A HEALTH CARE FACILITY: Primary | ICD-10-CM

## 2025-04-14 PROCEDURE — 99396 PREV VISIT EST AGE 40-64: CPT | Performed by: PHYSICIAN ASSISTANT

## 2025-04-14 NOTE — PROGRESS NOTES
Subjective   Burke Cedeno Jr is a 49 y.o. male  Annual Exam (Not fasting. UTD on colonoscopy (due in 2033)) and Heartburn (Takes PepcidAC when he has sx's but doesn't take anything daily)      History of Present Illness  History of Present Illness  The patient presents for a routine checkup.    He reports experiencing intermittent heartburn, which he has identified as being triggered by certain foods, particularly pizza and spicy dishes. He has been managing this condition with Pepcid AC, which he finds effective.    He maintains an active lifestyle, engaging in regular physical exercise. His dietary habits include a high intake of sweets and fish, while he abstains from red meat and pork, opting for turkey instead.    He expresses concern about potential health risks associated with his age and is seeking advice on necessary precautions. He is also inquiring about the frequency of colonoscopies, as he was previously advised to undergo the procedure every 10 years.    MEDICATIONS  Pepcid AC    The following portions of the patient's history were reviewed and updated as appropriate: allergies, current medications, past social history and problem list    Review of Systems   Constitutional: Negative.    HENT: Negative.     Eyes: Negative.    Respiratory: Negative.     Cardiovascular: Negative.    Gastrointestinal: Negative.    Endocrine: Negative.    Genitourinary: Negative.    Musculoskeletal: Negative.    Skin: Negative.    Allergic/Immunologic: Negative.    Neurological: Negative.    Hematological: Negative.    Psychiatric/Behavioral: Negative.     All other systems reviewed and are negative.      Objective     Vitals:    04/14/25 1443   BP: 130/94   Pulse: 85   Resp: 14   Temp: 97.9 °F (36.6 °C)   SpO2: 96%       Physical Exam  Vitals and nursing note reviewed.   Constitutional:       General: He is not in acute distress.     Appearance: Normal appearance. He is well-developed. He is not ill-appearing, toxic-appearing  or diaphoretic.   HENT:      Head: Normocephalic and atraumatic.      Right Ear: External ear normal.      Left Ear: External ear normal.   Eyes:      Conjunctiva/sclera: Conjunctivae normal.      Pupils: Pupils are equal, round, and reactive to light.   Neck:      Thyroid: No thyromegaly.      Vascular: No carotid bruit.   Cardiovascular:      Rate and Rhythm: Normal rate and regular rhythm.      Pulses: Normal pulses.      Heart sounds: Normal heart sounds. No murmur heard.  Pulmonary:      Effort: Pulmonary effort is normal. No respiratory distress.      Breath sounds: Normal breath sounds.   Abdominal:      General: Bowel sounds are normal.      Palpations: Abdomen is soft. There is no mass.      Tenderness: There is no abdominal tenderness.   Musculoskeletal:         General: No swelling. Normal range of motion.      Cervical back: Normal range of motion and neck supple.   Lymphadenopathy:      Cervical: No cervical adenopathy.   Skin:     General: Skin is warm and dry.      Findings: No lesion or rash.   Neurological:      Mental Status: He is alert and oriented to person, place, and time.      Cranial Nerves: No cranial nerve deficit.      Sensory: No sensory deficit.      Motor: No weakness.      Coordination: Coordination normal.      Gait: Gait normal.      Deep Tendon Reflexes: Reflexes are normal and symmetric.   Psychiatric:         Mood and Affect: Mood normal.         Behavior: Behavior normal.         Thought Content: Thought content normal.         Judgment: Judgment normal.       Physical Exam      Assessment & Plan   Assessment & Plan  1. Heartburn.  He reports experiencing heartburn, particularly after consuming certain foods like pizza and spicy dishes. He is currently taking Pepcid AC, which he finds effective. He is advised to continue using Pepcid AC as needed. Additionally, he should avoid late-night meals, reduce portion sizes, and identify and avoid foods that trigger heartburn. A  healthier diet with more whole foods and weight loss is recommended to help manage symptoms.    2. Health Maintenance.  A comprehensive panel of blood work will be conducted to assess his overall health status, including cholesterol, sodium, potassium, and chloride levels. Prostate cancer screening will also be performed through a blood test. He is advised to undergo a colonoscopy either in 5 or 10 years, depending on his preference and previous screening results. Regular annual blood work and screenings for heart disease, colon cancer, and prostate cancer are recommended to prevent potential health issues.    Diagnoses and all orders for this visit:    1. Routine general medical examination at a health care facility (Primary)  -     Comprehensive Metabolic Panel; Future  -     Lipid Panel; Future  -     TSH; Future  -     CBC (No Diff); Future  -     Hemoglobin A1c; Future     Preventive medicine discussed, diet, exercise, healthy living discussed at length.  Discussed nutrition, physical activity, healthy weight, injury prevention, misuse of tobacco, alcohol and drugs, dental health, mental health, immunizations, screening    Part of this note may be an electronic transcription/translation of spoken language to printed text using the Dragon Dictation System.       Patient or patient representative verbalized consent for the use of Ambient Listening during the visit with  AGUS More for chart documentation. 4/14/2025  14:48 EDT

## 2025-04-17 ENCOUNTER — TELEPHONE (OUTPATIENT)
Dept: FAMILY MEDICINE CLINIC | Facility: CLINIC | Age: 50
End: 2025-04-17
Payer: COMMERCIAL

## 2025-04-17 NOTE — TELEPHONE ENCOUNTER
Caller: Burke Cedeno Jr    Relationship: Self    Best call back number: [phone]     Caller requesting test results: SELF    What test was performed: BLOOD WORK    When was the test performed: 4/15/25        Additional notes: PATIENT HAS QUESTIONS ABOUT HIS RESULTS AND REQUESTS TO SPEAK TO SOMEONE ABOUT THEM. PLEASE CALL AFTER 1:30 PM

## 2025-04-17 NOTE — TELEPHONE ENCOUNTER
Patient notified.   He saw that bilirubin was elevated but Jay said it's nothing to be concerned about because his liver enzymes are normal.

## 2025-05-02 ENCOUNTER — TELEPHONE (OUTPATIENT)
Dept: FAMILY MEDICINE CLINIC | Facility: CLINIC | Age: 50
End: 2025-05-02
Payer: COMMERCIAL

## 2025-05-02 NOTE — TELEPHONE ENCOUNTER
Caller: Burke Cedeno Jr    Relationship: Self    Best call back number: [phone]     What medication are you requesting: SOMETHING FOR ALLERGIES     What are your current symptoms: SNEEZING RUNNY NOSE AND DRAINAGE    How long have you been experiencing symptoms: 3 DAYS     Have you had these symptoms before:    [] Yes  [x] No    Have you been treated for these symptoms before:   [] Yes  [x] No    If a prescription is needed, what is your preferred pharmacy and phone number: [facility] #[id number] - [address] [phone] [facility] [phone]      Additional notes:PLEASE CALL PATIENT ONCE THE REQUEST HAS BEEN APPROVED AND SENT TO THE PHARMACY .    IF THE APPOINTMENT IS NEEDED PLEASE CALL PATIENT TO SCHEDULE

## 2025-07-16 ENCOUNTER — OFFICE VISIT (OUTPATIENT)
Dept: FAMILY MEDICINE CLINIC | Facility: CLINIC | Age: 50
End: 2025-07-16
Payer: COMMERCIAL

## 2025-07-16 VITALS
TEMPERATURE: 98.5 F | BODY MASS INDEX: 37.7 KG/M2 | WEIGHT: 269.3 LBS | HEART RATE: 83 BPM | HEIGHT: 71 IN | OXYGEN SATURATION: 99 % | DIASTOLIC BLOOD PRESSURE: 78 MMHG | SYSTOLIC BLOOD PRESSURE: 130 MMHG

## 2025-07-16 DIAGNOSIS — R19.01 ABDOMINAL MASS, RIGHT UPPER QUADRANT: Primary | ICD-10-CM

## 2025-07-16 DIAGNOSIS — M54.9 ACUTE BACK PAIN, UNSPECIFIED BACK LOCATION, UNSPECIFIED BACK PAIN LATERALITY: ICD-10-CM

## 2025-07-16 DIAGNOSIS — R19.01 RUQ ABDOMINAL MASS: Primary | ICD-10-CM

## 2025-07-16 PROCEDURE — 99213 OFFICE O/P EST LOW 20 MIN: CPT | Performed by: PHYSICIAN ASSISTANT

## 2025-07-16 RX ORDER — IBUPROFEN 800 MG/1
800 TABLET, FILM COATED ORAL EVERY 6 HOURS PRN
COMMUNITY

## 2025-07-16 RX ORDER — CYCLOBENZAPRINE HCL 10 MG
10 TABLET ORAL NIGHTLY
COMMUNITY

## 2025-07-16 RX ORDER — LIDOCAINE 50 MG/G
1 PATCH TOPICAL EVERY 24 HOURS
COMMUNITY

## 2025-07-16 NOTE — PROGRESS NOTES
Subjective   Burke Cedeno Jr is a 49 y.o. male  Back Pain (Hurt back a job last week, seem by Paul OLIVEROS, was told her needed to see PCP for possible Gallstone seen on scan)      Back Pain  Associated symptoms: no chest pain, no headaches and no weakness      History of Present Illness  The patient is a 49-year-old male presenting today for possible gallstones.    He initially sought medical attention due to back pain, which was exacerbated following an injury on Friday. An x-ray of his back revealed a large calcified density in the right upper abdomen, measuring up to 3.9 cm, raising questions about the presence of a gallstone or renal stone. He has no prior history of gallstones or kidney stones and reports no gastrointestinal issues such as nausea or vomiting after meals. His bowel movements are regular, and he has not noticed any blood in his stool or urine. He also reports no family history of kidney stones or gallstones.    FAMILY HISTORY  He does not recall any family history of kidney stones or gallstones.    The following portions of the patient's history were reviewed and updated as appropriate: allergies, current medications, past social history and problem list    Review of Systems   Constitutional:  Positive for activity change. Negative for fatigue and unexpected weight change.   Respiratory:  Negative for cough, chest tightness and shortness of breath.    Cardiovascular:  Negative for chest pain, palpitations and leg swelling.   Gastrointestinal: Negative.  Negative for nausea.   Musculoskeletal:  Positive for back pain.   Skin:  Negative for color change and rash.   Neurological:  Negative for dizziness, syncope, weakness and headaches.       Objective     Vitals:    07/16/25 1053   BP: 130/78   Pulse: 83   Temp: 98.5 °F (36.9 °C)   SpO2: 99%       Physical Exam  Vitals and nursing note reviewed.   Constitutional:       General: He is not in acute distress.     Appearance: Normal appearance. He is  well-developed. He is obese. He is not ill-appearing, toxic-appearing or diaphoretic.      Comments: [id number] noted     Neck:      Thyroid: No thyromegaly.   Cardiovascular:      Rate and Rhythm: Normal rate and regular rhythm.      Heart sounds: Normal heart sounds. No murmur heard.  Pulmonary:      Effort: Pulmonary effort is normal. No respiratory distress.      Breath sounds: Normal breath sounds.   Abdominal:      Palpations: Abdomen is soft. There is no mass.      Tenderness: There is no abdominal tenderness.   Skin:     General: Skin is warm and dry.      Coloration: Skin is not jaundiced.   Neurological:      Mental Status: He is alert.   Psychiatric:         Mood and Affect: Mood normal.         Behavior: Behavior normal.         Thought Content: Thought content normal.         Judgment: Judgment normal.       Physical Exam      Assessment & Plan   Assessment & Plan  1. Possible gallstones.  The patient's symptoms and the size of the calcified density in the right upper abdomen suggest a possible gallstone. The likelihood of a kidney stone is less than 1% due to the absence of severe pain during urination and the unusual size of the stone. It could also be a cyst. An ultrasound of the gallbladder, kidney, and liver will be ordered to further investigate the cause of the pain. The results will be communicated within 24 hours. If the ultrasound does not provide sufficient information, a CT scan may be considered.    Diagnoses and all orders for this visit:    1. Abdominal mass, right upper quadrant (Primary)  -     US Renal Limited; Future  -     US Gallbladder; Future  -     US Liver; Future    2. Acute back pain, unspecified back location, unspecified back pain laterality         Patient or patient representative verbalized consent for the use of Ambient Listening during the visit with  Carmencita Rangel PA-C for chart documentation. 7/16/2025  12:32 EDT

## 2025-07-17 ENCOUNTER — HOSPITAL ENCOUNTER (OUTPATIENT)
Dept: ULTRASOUND IMAGING | Facility: HOSPITAL | Age: 50
Discharge: HOME | End: 2025-07-17
Admitting: PHYSICIAN ASSISTANT
Payer: COMMERCIAL

## 2025-07-17 DIAGNOSIS — R19.01 RUQ ABDOMINAL MASS: ICD-10-CM

## 2025-07-17 PROCEDURE — 76700 US EXAM ABDOM COMPLETE: CPT

## 2025-07-18 ENCOUNTER — TELEPHONE (OUTPATIENT)
Dept: FAMILY MEDICINE CLINIC | Facility: CLINIC | Age: 50
End: 2025-07-18
Payer: COMMERCIAL

## 2025-07-18 ENCOUNTER — RESULTS FOLLOW-UP (OUTPATIENT)
Dept: FAMILY MEDICINE CLINIC | Facility: CLINIC | Age: 50
End: 2025-07-18
Payer: COMMERCIAL

## 2025-07-18 DIAGNOSIS — K80.20 GALLSTONES: Primary | ICD-10-CM

## 2025-07-18 NOTE — TELEPHONE ENCOUNTER
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

## 2025-07-18 NOTE — TELEPHONE ENCOUNTER
Caller: Burke Cedeno Jr    Relationship: Self    Best call back number: [phone]     Caller requesting test results: SELF    What test was performed: ULTRASOUND    When was the test performed: 07.17.25    Where was the test performed: [facility]

## 2025-07-18 NOTE — TELEPHONE ENCOUNTER
I have just sent patient a letter in [facility] he has a gallstone and needs to be seen by a surgeon to see if he needs to have it removed but there is no active infection, all of his other organs look fine other than a little bit of fatty buildup on his liver which should not be causing him back pain either, I would recommend he continue following up with his Workmen's Comp. for separate evaluation and treatment of his back pain I do not feel like any of this has anything to do with his back pain.